# Patient Record
Sex: FEMALE | Race: WHITE | URBAN - METROPOLITAN AREA
[De-identification: names, ages, dates, MRNs, and addresses within clinical notes are randomized per-mention and may not be internally consistent; named-entity substitution may affect disease eponyms.]

---

## 2022-06-04 ENCOUNTER — OFFICE VISIT (OUTPATIENT)
Dept: URBAN - METROPOLITAN AREA CLINIC 7 | Facility: CLINIC | Age: 67
End: 2022-06-04
Payer: COMMERCIAL

## 2022-06-04 DIAGNOSIS — H25.13 AGE-RELATED NUCLEAR CATARACT, BILATERAL: ICD-10-CM

## 2022-06-04 DIAGNOSIS — H43.813 VITREOUS DEGENERATION, BILATERAL: Primary | ICD-10-CM

## 2022-06-04 DIAGNOSIS — H43.11 VITREOUS HEMORRHAGE, RIGHT EYE: ICD-10-CM

## 2022-06-04 PROCEDURE — 99204 OFFICE O/P NEW MOD 45 MIN: CPT | Performed by: OPHTHALMOLOGY

## 2022-06-04 ASSESSMENT — INTRAOCULAR PRESSURE
OS: 21
OD: 21

## 2022-06-04 NOTE — IMPRESSION/PLAN
Impression: Vitreous hemorrhage, right eye: H43.11. Plan: Mild VH OD. Should resolve with time. Discussed diagnosis of mild VH. Discussed possible etiologies of VH including a retinal tear, retinal neovascularization, choroidal neovascularization, retinal artery macroaneurysm, and avulsion of a retinal vessel. Discussed vitrectomy surgery to clear VH including risks associated with surgery. Patient elects observation to see if [facility] will clear with time. If VH does not clear, will consider vitrectomy surgery.

## 2022-06-04 NOTE — IMPRESSION/PLAN
Impression: Age-related nuclear cataract, bilateral: H25.13. Plan: Not visually significant OU, observe.

## 2022-06-04 NOTE — IMPRESSION/PLAN
Impression: Vitreous degeneration, bilateral: H43.813. Acute PVD OD Chronic PVD OS Plan: Reports new floaters OD. PVD with mild VH OD.  VH overyling optic nerve (? avulsed vessel at disc). Scleral depressed exam reveals no retinal tear or retinal detachment OU. Discussed diagnosis of PVD. Informed patient of risk for developing a retinal tear or detachment secondary to a PVD. Reviewed signs and symptoms of a retinal tear and retinal detachment. Advised patient to contact us immediately for any new floaters, flashing lights, or a shadow in the vision. 

RTC 6-8 weeks DFE OU

## 2022-07-22 ENCOUNTER — OFFICE VISIT (OUTPATIENT)
Dept: URBAN - METROPOLITAN AREA CLINIC 54 | Facility: CLINIC | Age: 67
End: 2022-07-22
Payer: COMMERCIAL

## 2022-07-22 DIAGNOSIS — H43.813 VITREOUS DEGENERATION, BILATERAL: Primary | ICD-10-CM

## 2022-07-22 DIAGNOSIS — H25.13 AGE-RELATED NUCLEAR CATARACT, BILATERAL: ICD-10-CM

## 2022-07-22 DIAGNOSIS — H43.11 VITREOUS HEMORRHAGE, RIGHT EYE: ICD-10-CM

## 2022-07-22 PROCEDURE — 92014 COMPRE OPH EXAM EST PT 1/>: CPT | Performed by: OPHTHALMOLOGY

## 2022-07-22 PROCEDURE — 92134 CPTRZ OPH DX IMG PST SGM RTA: CPT | Performed by: OPHTHALMOLOGY

## 2022-07-22 ASSESSMENT — INTRAOCULAR PRESSURE
OD: 18
OS: 21

## 2022-11-29 NOTE — IMPRESSION/PLAN
Impression: Vitreous degeneration, bilateral: H43.813. Acute PVD OD Chronic PVD OS Plan: Scleral depressed exam reveals no retinal tear or retinal detachment OU. Discussed diagnosis of PVD. Informed patient of risk for developing a retinal tear or detachment secondary to a PVD. Reviewed signs and symptoms of a retinal tear and retinal detachment. Advised patient to contact us immediately for any new floaters, flashing lights, or a shadow in the vision. 

Return PRN, OCT OU Baldev Diaz

## 2024-11-14 ENCOUNTER — OFFICE VISIT (OUTPATIENT)
Dept: URBAN - METROPOLITAN AREA CLINIC 51 | Facility: CLINIC | Age: 69
End: 2024-11-14
Payer: COMMERCIAL

## 2024-11-14 DIAGNOSIS — H01.00B UNSPECIFIED BLEPHARITIS LEFT EYE, UPPER AND LOWER EYELIDS: ICD-10-CM

## 2024-11-14 DIAGNOSIS — H01.00A UNSPECIFIED BLEPHARITIS RIGHT EYE, UPPER AND LOWER EYELIDS: ICD-10-CM

## 2024-11-14 DIAGNOSIS — H00.15 CHALAZION LEFT LOWER LID: Primary | ICD-10-CM

## 2024-11-14 PROCEDURE — 99204 OFFICE O/P NEW MOD 45 MIN: CPT | Performed by: OPTOMETRIST

## 2024-11-14 RX ORDER — ERYTHROMYCIN 5 MG/G
OINTMENT OPHTHALMIC
Qty: 30 | Refills: 0 | Status: ACTIVE
Start: 2024-11-14

## 2024-11-14 ASSESSMENT — KERATOMETRY
OS: 43.88
OD: 44.13

## 2024-12-30 ENCOUNTER — OFFICE VISIT (OUTPATIENT)
Dept: URBAN - METROPOLITAN AREA CLINIC 51 | Facility: CLINIC | Age: 69
End: 2024-12-30
Payer: MEDICARE

## 2024-12-30 DIAGNOSIS — H00.15 CHALAZION LEFT LOWER LID: Primary | ICD-10-CM

## 2024-12-30 PROCEDURE — 99213 OFFICE O/P EST LOW 20 MIN: CPT | Performed by: OPTOMETRIST
